# Patient Record
Sex: MALE | Race: WHITE | NOT HISPANIC OR LATINO | URBAN - METROPOLITAN AREA
[De-identification: names, ages, dates, MRNs, and addresses within clinical notes are randomized per-mention and may not be internally consistent; named-entity substitution may affect disease eponyms.]

---

## 2017-04-14 ENCOUNTER — OFFICE VISIT - HEALTHEAST (OUTPATIENT)
Dept: VASCULAR SURGERY | Facility: CLINIC | Age: 62
End: 2017-04-14

## 2017-04-14 DIAGNOSIS — I73.9 PAD (PERIPHERAL ARTERY DISEASE) (H): ICD-10-CM

## 2017-04-14 DIAGNOSIS — I73.9 CLAUDICATION IN PERIPHERAL VASCULAR DISEASE (H): ICD-10-CM

## 2017-04-14 DIAGNOSIS — I70.0 ATHEROSCLEROSIS OF AORTA (H): ICD-10-CM

## 2017-04-14 DIAGNOSIS — Z95.828 H/O ARTERIAL BYPASS OF LOWER LIMB: ICD-10-CM

## 2017-04-14 RX ORDER — ROSUVASTATIN CALCIUM 5 MG/1
TABLET, COATED ORAL
Status: SHIPPED | COMMUNITY
Start: 2016-12-12

## 2017-04-14 RX ORDER — CHLORAL HYDRATE 500 MG
3 CAPSULE ORAL
Status: SHIPPED | COMMUNITY
Start: 2011-08-18

## 2017-04-14 ASSESSMENT — MIFFLIN-ST. JEOR: SCORE: 1960.32

## 2017-04-19 ENCOUNTER — RECORDS - HEALTHEAST (OUTPATIENT)
Dept: VASCULAR ULTRASOUND | Facility: CLINIC | Age: 62
End: 2017-04-19

## 2017-04-19 ENCOUNTER — RECORDS - HEALTHEAST (OUTPATIENT)
Dept: ADMINISTRATIVE | Facility: OTHER | Age: 62
End: 2017-04-19

## 2017-04-19 DIAGNOSIS — I73.9 PERIPHERAL VASCULAR DISEASE, UNSPECIFIED (H): ICD-10-CM

## 2017-04-19 DIAGNOSIS — Z95.828 PRESENCE OF OTHER VASCULAR IMPLANTS AND GRAFTS: ICD-10-CM

## 2017-04-19 DIAGNOSIS — I70.0 ATHEROSCLEROSIS OF AORTA (H): ICD-10-CM

## 2017-04-21 ENCOUNTER — OFFICE VISIT - HEALTHEAST (OUTPATIENT)
Dept: VASCULAR SURGERY | Facility: CLINIC | Age: 62
End: 2017-04-21

## 2017-04-21 DIAGNOSIS — I73.9 PAD (PERIPHERAL ARTERY DISEASE) (H): ICD-10-CM

## 2017-04-21 DIAGNOSIS — I72.4 POPLITEAL ANEURYSM (H): ICD-10-CM

## 2017-04-21 DIAGNOSIS — Z95.828 H/O ARTERIAL BYPASS OF LOWER LIMB: ICD-10-CM

## 2017-04-21 DIAGNOSIS — I73.9 CLAUDICATION IN PERIPHERAL VASCULAR DISEASE (H): ICD-10-CM

## 2017-11-03 ENCOUNTER — AMBULATORY - HEALTHEAST (OUTPATIENT)
Dept: VASCULAR SURGERY | Facility: CLINIC | Age: 62
End: 2017-11-03

## 2017-11-03 ENCOUNTER — OFFICE VISIT - HEALTHEAST (OUTPATIENT)
Dept: VASCULAR SURGERY | Facility: CLINIC | Age: 62
End: 2017-11-03

## 2017-11-03 ENCOUNTER — RECORDS - HEALTHEAST (OUTPATIENT)
Dept: VASCULAR ULTRASOUND | Facility: CLINIC | Age: 62
End: 2017-11-03

## 2017-11-03 DIAGNOSIS — I73.9 CLAUDICATION IN PERIPHERAL VASCULAR DISEASE (H): ICD-10-CM

## 2017-11-03 DIAGNOSIS — I72.4 POPLITEAL ANEURYSM (H): ICD-10-CM

## 2017-11-03 DIAGNOSIS — I73.9 PERIPHERAL VASCULAR DISEASE, UNSPECIFIED (H): ICD-10-CM

## 2017-11-03 DIAGNOSIS — I73.9 PAD (PERIPHERAL ARTERY DISEASE) (H): ICD-10-CM

## 2017-11-03 DIAGNOSIS — Z95.828 H/O ARTERIAL BYPASS OF LOWER LIMB: ICD-10-CM

## 2017-11-03 RX ORDER — KETOCONAZOLE 20 MG/G
CREAM TOPICAL
Status: SHIPPED | COMMUNITY
Start: 2017-10-17

## 2018-05-30 ENCOUNTER — COMMUNICATION - HEALTHEAST (OUTPATIENT)
Dept: VASCULAR SURGERY | Facility: CLINIC | Age: 63
End: 2018-05-30

## 2020-01-16 ENCOUNTER — RECORDS - HEALTHEAST (OUTPATIENT)
Dept: ADMINISTRATIVE | Facility: OTHER | Age: 65
End: 2020-01-16

## 2021-05-30 VITALS — BODY MASS INDEX: 28.62 KG/M2 | WEIGHT: 235 LBS | HEIGHT: 76 IN

## 2021-06-10 NOTE — PROGRESS NOTES
Vascular surgery: This 62-year-old male comes in following recent noninvasive vascular studies.  This showed that the patient's right popliteal aneurysm has grown slightly from 1.6 x 1.7 cm to 1.8 x 2.0 cm.  The previous ultrasound used for comparison was 4 years ago.  The patient does not have any symptoms.  With a maximal diameter of 2.0 cm, the aneurysm is just coming to a size where we might be concerned.  In light of that, I think a repeat ultrasound of the patient's right popliteal artery in 6 months is appropriate and we will make those arrangements.    As regards the patient's abdominal aorta, there is ectasia but no evidence of aneurysm.  The patient does have known arterial insufficiency in the left leg but this is stable and he continues to ambulate 2 miles every day.    We will follow-up with the patient in 6 months when he has a repeat ultrasound.

## 2021-06-10 NOTE — PROGRESS NOTES
Follow-up, PVD w/ claudication, left SFA to peroneal bypass graft 11/2013. Arterial studies done 12/2015 showed femoral artery to peroneal artery bypass graft is occluded, patient however was walking 2 miles daily w/o issues, at that time reported no rest pain w/ no non-healing ulcers. Maintained in ASA and Plavix

## 2021-06-10 NOTE — PROGRESS NOTES
Vascular surgery: This 62-year-old male was seen today, with known history of thrombosed left popliteal aneurysm and subsequent pelvic bypass in that leg.  Despite this, the patient is doing reasonably well.  He does exercise and walks 2 bowels each day without claudication.  He does not have rest pain, nonhealing ulcers or other pregangrenous symptoms.    The patient is on anticoagulants which include aspirin and Plavix.    On exam, the patient is alert male in no acute distress.  Head and neck exam is normal.  Lungs are clear.  The heart exhibits regular rate and rhythm.  Abdominal exam does not show palpable aneurysm.  Peripheral pulses, left, as follows: Femorals 2 over 2, popliteals 2 over 0, dorsalis pedis 2-0 silk, posterior tibial 2 over 0.  The left foot appears satisfactorily vascularized.    Impression: Arterial occlusive disease, left lower extremity, secondary to thrombosed left popliteal artery aneurysm.    Plan: The patient's current situation is stable but, given his past experience, surveillance is important here.  I would like to obtain ankle-brachial indices with exercise, and I would like a duplex ultrasound of the abdominal aorta and the right popliteal artery to make sure there is not aneurysmal transformation in these areas.  I discussed this with the patient and he agrees.

## 2021-06-13 NOTE — PROGRESS NOTES
Follow-up, PAD w/ Hx of left SFA to peroneal bypass graft 11/2013. Vascular imaging done 04/19/17 shows an ectatic, but non-aneurysmal abd aorta, there is a 2 cm right popliteal artery aneurysm. Abnormal resting and post exercise ABIs on the left, consistent with known graft occlusion, normal resting and post exercise ABIs on the right. Patient has repeat arterial studiy prior to appt. Crestor, ASA and PLavix. Patient has a a callus on the ball of his foot, this is being followed by podiatry, applying medi-honey and foam.

## 2021-06-13 NOTE — PROGRESS NOTES
Patient is here with concerns of new callus and ulcer on left foot. He has been following with a podiatrist who has trimmed the callus and recommended a shoe insert and bunion shoe when at home.  He still has no claudication symptoms of the left leg but is experiencing some pain of the left toes radiating out from this ulcer.  No fevers or chills.  Patient also underwent repeat US of right popliteal artery aneurysm today.     On exam:   Left foot is cool, slowed capillary refill.  Callus on the dorsum of the left first metatarsal with central ulcer only 2 mm with clean base and no purulence.      Plan:   Encouraged patient to continue with the podiatrist and to have her adjust the shoe insert if it is not successful at offloading this area of callus.  Otherwise he should stay active, and follow up with us in a year for repeat US of the right popliteal.     Patient seen with DR. Yuri Pinto.

## 2025-01-15 NOTE — PROGRESS NOTES
Follow-up, PVD w/ claudication, left SFA to peroneal bypass graft 11/2013. Vascular imaging done 04/19 shows an ectatic, but aneurysmal abd aorta,  2 cm Right popliteal artery aneurysm, and ABIs - left, consistent with known left graft occlusion and ABIs right WNL, little change compared to prior imaging.    Statement Selected